# Patient Record
Sex: MALE | Race: WHITE | NOT HISPANIC OR LATINO | URBAN - METROPOLITAN AREA
[De-identification: names, ages, dates, MRNs, and addresses within clinical notes are randomized per-mention and may not be internally consistent; named-entity substitution may affect disease eponyms.]

---

## 2017-02-17 ENCOUNTER — EMERGENCY (EMERGENCY)
Facility: HOSPITAL | Age: 37
LOS: 1 days | Discharge: HOME | End: 2017-02-17
Attending: EMERGENCY MEDICINE | Admitting: EMERGENCY MEDICINE
Payer: MEDICAID

## 2017-02-17 VITALS
SYSTOLIC BLOOD PRESSURE: 128 MMHG | HEART RATE: 81 BPM | DIASTOLIC BLOOD PRESSURE: 72 MMHG | RESPIRATION RATE: 16 BRPM | OXYGEN SATURATION: 100 %

## 2017-02-17 VITALS
OXYGEN SATURATION: 100 % | HEART RATE: 65 BPM | DIASTOLIC BLOOD PRESSURE: 71 MMHG | SYSTOLIC BLOOD PRESSURE: 110 MMHG | TEMPERATURE: 97 F | RESPIRATION RATE: 16 BRPM

## 2017-02-17 LAB
ALBUMIN SERPL ELPH-MCNC: 4.3 G/DL — SIGNIFICANT CHANGE UP (ref 3.3–5)
ALP SERPL-CCNC: 65 U/L — SIGNIFICANT CHANGE UP (ref 40–120)
ALT FLD-CCNC: 13 U/L — SIGNIFICANT CHANGE UP (ref 4–41)
AST SERPL-CCNC: 20 U/L — SIGNIFICANT CHANGE UP (ref 4–40)
BASOPHILS # BLD AUTO: 0.01 K/UL — SIGNIFICANT CHANGE UP (ref 0–0.2)
BASOPHILS NFR BLD AUTO: 0.1 % — SIGNIFICANT CHANGE UP (ref 0–2)
BILIRUB SERPL-MCNC: 0.2 MG/DL — SIGNIFICANT CHANGE UP (ref 0.2–1.2)
BUN SERPL-MCNC: 4 MG/DL — LOW (ref 7–23)
CALCIUM SERPL-MCNC: 9.4 MG/DL — SIGNIFICANT CHANGE UP (ref 8.4–10.5)
CHLORIDE SERPL-SCNC: 88 MMOL/L — LOW (ref 98–107)
CO2 SERPL-SCNC: 27 MMOL/L — SIGNIFICANT CHANGE UP (ref 22–31)
CREAT SERPL-MCNC: 0.59 MG/DL — SIGNIFICANT CHANGE UP (ref 0.5–1.3)
EOSINOPHIL # BLD AUTO: 0.2 K/UL — SIGNIFICANT CHANGE UP (ref 0–0.5)
EOSINOPHIL NFR BLD AUTO: 2.9 % — SIGNIFICANT CHANGE UP (ref 0–6)
GLUCOSE SERPL-MCNC: 83 MG/DL — SIGNIFICANT CHANGE UP (ref 70–99)
HCT VFR BLD CALC: 40 % — SIGNIFICANT CHANGE UP (ref 39–50)
HGB BLD-MCNC: 13.7 G/DL — SIGNIFICANT CHANGE UP (ref 13–17)
IMM GRANULOCYTES NFR BLD AUTO: 0.3 % — SIGNIFICANT CHANGE UP (ref 0–1.5)
LYMPHOCYTES # BLD AUTO: 1.72 K/UL — SIGNIFICANT CHANGE UP (ref 1–3.3)
LYMPHOCYTES # BLD AUTO: 24.8 % — SIGNIFICANT CHANGE UP (ref 13–44)
MCHC RBC-ENTMCNC: 32.7 PG — SIGNIFICANT CHANGE UP (ref 27–34)
MCHC RBC-ENTMCNC: 34.3 % — SIGNIFICANT CHANGE UP (ref 32–36)
MCV RBC AUTO: 95.5 FL — SIGNIFICANT CHANGE UP (ref 80–100)
MONOCYTES # BLD AUTO: 0.92 K/UL — HIGH (ref 0–0.9)
MONOCYTES NFR BLD AUTO: 13.3 % — SIGNIFICANT CHANGE UP (ref 2–14)
NEUTROPHILS # BLD AUTO: 4.06 K/UL — SIGNIFICANT CHANGE UP (ref 1.8–7.4)
NEUTROPHILS NFR BLD AUTO: 58.6 % — SIGNIFICANT CHANGE UP (ref 43–77)
PLATELET # BLD AUTO: 201 K/UL — SIGNIFICANT CHANGE UP (ref 150–400)
PMV BLD: 10.8 FL — SIGNIFICANT CHANGE UP (ref 7–13)
POTASSIUM SERPL-MCNC: 3.8 MMOL/L — SIGNIFICANT CHANGE UP (ref 3.5–5.3)
POTASSIUM SERPL-SCNC: 3.8 MMOL/L — SIGNIFICANT CHANGE UP (ref 3.5–5.3)
PROT SERPL-MCNC: 7.2 G/DL — SIGNIFICANT CHANGE UP (ref 6–8.3)
RBC # BLD: 4.19 M/UL — LOW (ref 4.2–5.8)
RBC # FLD: 12.5 % — SIGNIFICANT CHANGE UP (ref 10.3–14.5)
SODIUM SERPL-SCNC: 131 MMOL/L — LOW (ref 135–145)
WBC # BLD: 6.93 K/UL — SIGNIFICANT CHANGE UP (ref 3.8–10.5)
WBC # FLD AUTO: 6.93 K/UL — SIGNIFICANT CHANGE UP (ref 3.8–10.5)

## 2017-02-17 PROCEDURE — 70450 CT HEAD/BRAIN W/O DYE: CPT | Mod: 26

## 2017-02-17 PROCEDURE — 99284 EMERGENCY DEPT VISIT MOD MDM: CPT

## 2017-02-17 RX ORDER — TETANUS TOXOID, REDUCED DIPHTHERIA TOXOID AND ACELLULAR PERTUSSIS VACCINE, ADSORBED 5; 2.5; 8; 8; 2.5 [IU]/.5ML; [IU]/.5ML; UG/.5ML; UG/.5ML; UG/.5ML
0.5 SUSPENSION INTRAMUSCULAR ONCE
Qty: 0 | Refills: 0 | Status: COMPLETED | OUTPATIENT
Start: 2017-02-17 | End: 2017-02-17

## 2017-02-17 RX ORDER — MIDAZOLAM HYDROCHLORIDE 1 MG/ML
2 INJECTION, SOLUTION INTRAMUSCULAR; INTRAVENOUS ONCE
Qty: 0 | Refills: 0 | Status: DISCONTINUED | OUTPATIENT
Start: 2017-02-17 | End: 2017-02-17

## 2017-02-17 RX ORDER — HALOPERIDOL DECANOATE 100 MG/ML
5 INJECTION INTRAMUSCULAR ONCE
Qty: 0 | Refills: 0 | Status: COMPLETED | OUTPATIENT
Start: 2017-02-17 | End: 2017-02-17

## 2017-02-17 RX ADMIN — Medication 2 MILLIGRAM(S): at 13:40

## 2017-02-17 RX ADMIN — Medication 2 MILLIGRAM(S): at 12:23

## 2017-02-17 RX ADMIN — MIDAZOLAM HYDROCHLORIDE 2 MILLIGRAM(S): 1 INJECTION, SOLUTION INTRAMUSCULAR; INTRAVENOUS at 14:31

## 2017-02-17 RX ADMIN — TETANUS TOXOID, REDUCED DIPHTHERIA TOXOID AND ACELLULAR PERTUSSIS VACCINE, ADSORBED 0.5 MILLILITER(S): 5; 2.5; 8; 8; 2.5 SUSPENSION INTRAMUSCULAR at 16:01

## 2017-02-17 NOTE — ED PROVIDER NOTE - CARE PLAN
Principal Discharge DX:	Contusion  Instructions for follow-up, activity and diet:	Rest, drink plenty of fluids.  Advance activity as tolerated.  Continue all previously prescribed medications as directed.  Take Tylenol 650mg (Two 325 mg pills) every 4-6 hours as needed for pain. Keep abrasion clean and dry.  Apply bacitracin daily and cover.  Follow up with your primary care physician in 48-72 hours- bring copies of your results.  Return to the ER for worsening or persistent symptoms, including weakness, vomiting, visual/hearing changes, worsening/persistent pain and/or ANY NEW OR CONCERNING SYMPTOMS. If you have issues obtaining follow up, please call: [phone] to obtain a doctor or specialist who takes your insurance in your area.  Secondary Diagnosis:	Abrasion Principal Discharge DX:	Contusion  Instructions for follow-up, activity and diet:	Rest, drink plenty of fluids.  Advance activity as tolerated.  Continue all previously prescribed medications as directed.  Take Tylenol 650mg (Two 325 mg pills) every 4-6 hours as needed for pain. Keep abrasion clean and dry.  Apply bacitracin daily and cover.  Follow up with your primary care physician in 48-72 hours- bring copies of your results.  Return to the ER for worsening or persistent symptoms, including weakness, vomiting, visual/hearing changes, worsening/persistent pain and/or ANY NEW OR CONCERNING SYMPTOMS. If you have issues obtaining follow up, please call: [phone] to obtain a doctor or specialist who takes your insurance in your area.  Secondary Diagnosis:	Abrasion Principal Discharge DX:	Contusion  Instructions for follow-up, activity and diet:	Rest, drink plenty of fluids.  Advance activity as tolerated.  Continue all previously prescribed medications as directed.  Take Tylenol 650mg (Two 325 mg pills) every 4-6 hours as needed for pain. Keep abrasion clean and dry.  Apply bacitracin daily and cover.  Follow up with your primary care physician in 48-72 hours- bring copies of your results.  Return to the ER for worsening or persistent symptoms, including weakness, vomiting, visual/hearing changes, worsening/persistent pain and/or ANY NEW OR CONCERNING SYMPTOMS. If you have issues obtaining follow up, please call: [phone] to obtain a doctor or specialist who takes your insurance in your area.  Secondary Diagnosis:	Abrasion Principal Discharge DX:	Contusion  Instructions for follow-up, activity and diet:	Rest, drink plenty of fluids.  Advance activity as tolerated.  Continue all previously prescribed medications as directed.  Take Tylenol 650mg (Two 325 mg pills) every 4-6 hours as needed for pain. Keep abrasion clean and dry.  Apply bacitracin daily and cover.  Follow up with your primary care physician in 48-72 hours- bring copies of your results.  Return to the ER for worsening or persistent symptoms, including weakness, vomiting, visual/hearing changes, worsening/persistent pain and/or ANY NEW OR CONCERNING SYMPTOMS. If you have issues obtaining follow up, please call: [phone] to obtain a doctor or specialist who takes your insurance in your area.  Secondary Diagnosis:	Abrasion Principal Discharge DX:	Contusion  Instructions for follow-up, activity and diet:	Rest, drink plenty of fluids.  Advance activity as tolerated.  Continue all previously prescribed medications as directed.  Take Tylenol 650mg (Two 325 mg pills) every 4-6 hours as needed for pain. Keep abrasion clean and dry.  Apply bacitracin daily and cover.  Follow up with your primary care physician in 48-72 hours- bring copies of your results.  Return to the ER for worsening or persistent symptoms, including weakness, vomiting, visual/hearing changes, worsening/persistent pain and/or ANY NEW OR CONCERNING SYMPTOMS. If you have issues obtaining follow up, please call: [phone] to obtain a doctor or specialist who takes your insurance in your area.  Secondary Diagnosis:	Abrasion Principal Discharge DX:	Contusion  Instructions for follow-up, activity and diet:	Rest, drink plenty of fluids.  Advance activity as tolerated.  Continue all previously prescribed medications as directed.  Take Tylenol 650mg (Two 325 mg pills) every 4-6 hours as needed for pain. Keep abrasion clean and dry.  Apply bacitracin daily and cover.  Follow up with your primary care physician in 48-72 hours- bring copies of your results.  Return to the ER for worsening or persistent symptoms, including weakness, vomiting, visual/hearing changes, worsening/persistent pain and/or ANY NEW OR CONCERNING SYMPTOMS. If you have issues obtaining follow up, please call: [phone] to obtain a doctor or specialist who takes your insurance in your area.  Secondary Diagnosis:	Abrasion

## 2017-02-17 NOTE — ED PROVIDER NOTE - ATTENDING CONTRIBUTION TO CARE
36 y/o M w/ PMHx of Autism, Impulse Control Disorder, Pervasive developmental disorder, Leukopenia, thrombocytopenia, bib aides for left sup eyebrow contusion  Pt unable to provide  hx   staff states pt arrived at day program from residence with contusion/swelling.  they state they also spoke w residence, no known trauma.   pt w normal behaviour for him.  eating this am.  Pt is at baseline as per staff. Last Tetanus unknown.   PE vss, intermittently yelling, striking own head w fists, left upper eyelid/eyebrow small swelling w minimal abrasion, ecchymoses, eomi perrla,no crepitus, heent o/w nml, neck supple.  no midline no bony tender.  ext from strength5/5.  imp/plan- head injury, behaving appropriately per staff, w hx of thrombocytopenia, unknown mechanism , will ct head, reeval   I have discussed patient's plan of care and disposition with PA. 36 y/o M w/ PMHx of Autism, Impulse Control Disorder, Pervasive developmental disorder, Leukopenia, thrombocytopenia, bib aides for left sup eyebrow contusion  Pt unable to provide  hx   staff states pt arrived at day program from residence with contusion/swelling.  they state they also spoke w residence, no known trauma.   pt w normal behaviour for him.  eating this am.  Pt is at baseline as per staff. Last Tetanus unknown.   PE vss, intermittently yelling, striking own head w fists, left upper eyelid/eyebrow small swelling w minimal abrasion, ecchymoses, eomi perrla,no crepitus, heent o/w nml, neck supple.  no midline no bony tender.  ext from strength5/5.  imp/plan- head injury, behaving appropriately per staff, w hx of thrombocytopenia, unknown mechanism , will ct head, reeval   I have discussed patient's plan of care and disposition with PA.  pt able to have ct approx 245pm sp 2mg versed, labs reviewed, platelet 201  continue obs 38 y/o M w/ PMHx of Autism, Impulse Control Disorder, Pervasive developmental disorder, Leukopenia, thrombocytopenia, bib aides for left sup eyebrow contusion  Pt unable to provide  hx   staff states pt arrived at day program from residence with contusion/swelling.  they state they also spoke w residence, no known trauma.   pt w normal behaviour for him.  eating this am.  Pt is at baseline as per staff. Last Tetanus unknown.   PE vss, intermittently yelling, striking own head w fists, left upper eyelid/eyebrow small swelling w minimal abrasion, ecchymoses, eomi perrla,no crepitus, heent o/w nml, neck supple.  no midline no bony tender.  ext from strength5/5.  imp/plan- head injury, behaving appropriately per staff, w hx of thrombocytopenia, unknown mechanism , will ct head, reeval   I have discussed patient's plan of care and disposition with PA.  pt able to have ct approx 245pm sp 2mg versed, labs reviewed, platelet 201  continue obs  6pm pt improving, s/o dr torres dispo accordingly

## 2017-02-17 NOTE — ED PROVIDER NOTE - PLAN OF CARE
Rest, drink plenty of fluids.  Advance activity as tolerated.  Continue all previously prescribed medications as directed.  Take Tylenol 650mg (Two 325 mg pills) every 4-6 hours as needed for pain. Keep abrasion clean and dry.  Apply bacitracin daily and cover.  Follow up with your primary care physician in 48-72 hours- bring copies of your results.  Return to the ER for worsening or persistent symptoms, including weakness, vomiting, visual/hearing changes, worsening/persistent pain and/or ANY NEW OR CONCERNING SYMPTOMS. If you have issues obtaining follow up, please call: [phone] to obtain a doctor or specialist who takes your insurance in your area.

## 2017-02-17 NOTE — ED ADULT NURSE REASSESSMENT NOTE - NS ED NURSE REASSESS COMMENT FT1
Pt. agitated with autism and impulse control disorder received in rm 9. Ativan 2mg and Haldol 5mg IM ordered by MD. Both medications drawn up in separate syringes before entering the room due to agitation and safety precautions. First medication was delivered, second medication was discarded on accident in sharps container. Unable to identify which medication was given first. ZENIA Leslie informed right away; no harm done to patient. Awaiting response to medication given for CT. Incident rpt written up and manager made aware of situation. Will continue to monitor.

## 2017-02-17 NOTE — ED PROVIDER NOTE - EYE, LEFT
pupils equal, round, and reactive to light/extra occular movements intact, perrla, mild swelling to L eyebrow ridge w/ subcentimeter abrasion, nontender, no step offs, no ecchymosis pupils equal, round, and reactive to light/extra occular movements intact, perrla, mild swelling to L eyebrow ridge w/ subcentimeter abrasion, nontender, no step offs, no ecchymosis, no redness, no wamrth

## 2017-02-17 NOTE — ED ADULT TRIAGE NOTE - CHIEF COMPLAINT QUOTE
Pt brought in by 2 staff members from day program. Staff noticed hematoma to left outer eye brow area. No active bleeding noted, but large raised bump noted to area. Hx of Autism, nonverbal.

## 2017-02-17 NOTE — ED PROVIDER NOTE - NS ED MD SCRIBE ATTENDING SCRIBE SECTIONS
HIV/HISTORY OF PRESENT ILLNESS/REVIEW OF SYSTEMS/PHYSICAL EXAM/PAST MEDICAL/SURGICAL/SOCIAL HISTORY/VITAL SIGNS( Pullset)/DISPOSITION

## 2017-02-17 NOTE — ED PROVIDER NOTE - PROGRESS NOTE DETAILS
rn spoke w ZENIA foy, was to administer 5mg haldol/2mg ativan, rn gave one of injections,not sure which one, and then threw out other syringe w/o giving to pt.    will continue to observe to see if pt needs additional sedation for CT pt brought to CT x 2, not allowing ct to be done, given additional round of ativan.  placed IV, will try versed 2mg, try again ZENIA Riosos:  CT shows no acute pathology.  As per accompanying staff, pt at baseline.  Pt ambulating without any difficulty.  Vital signs stable.  Pt to follow up with PMD. ZENIA Leslie:  DANIELLAS. As per accompanying staff, pt mildly staggering while walking.  Will continue to observe. ZENIA Leslie:  As per accompanying staff, pt ambulation at baseline.  Pt walking around unit without difficulty.  Vital signs stable.  Pt medically stable for discharge.  Pt to follow up with PMD. ZENIA Leslie:  As per accompanying staff, pt ambulation at baseline.  Pt walking around unit without difficulty.  Vital signs stable.  Pt medically stable for discharge.  As per accompanying staff member, pt can will be monitored and observed 24/7 at his residence.  Pt to follow up with PMD. The scribe's documentation has been prepared under my direction and personally reviewed by me in its entirety. I confirm that the note above accurately reflects all work, treatment, procedures, and medical decision making performed by me, Saurabh Leslie PA-C.

## 2017-02-17 NOTE — ED ADULT NURSE REASSESSMENT NOTE - ANCILLARY STATUS
lab results pending/awaiting CT scan, pt unable to lay still in scanner returned to intake 9/awaiting radiology

## 2017-02-17 NOTE — ED ADULT NURSE REASSESSMENT NOTE - GENERAL PATIENT STATE
no change observed/pt continues to try to get up and walk in room, 2 health aides from facility with patient/comfortable appearance

## 2017-02-17 NOTE — ED ADULT NURSE REASSESSMENT NOTE - NS ED NURSE REASSESS COMMENT FT1
Pt lying in bed slightly drowsy but easily arousable; aide from facility at bedside. VS done as charted; breathing well on RA. Will continue to monitor.

## 2017-02-17 NOTE — ED PROVIDER NOTE - OBJECTIVE STATEMENT
38 y/o M w/ PMHx of, as derived from accompanying documentation, Autism, Impulse Control Disorder, Pervasive developmental disorder, Leukopenia, thrombocytopenia, p/w contusion to L eyelid/eyebrow. Pt unable to provide own hx secondary to sever autism. As per accompanying staff, pt arrived to day program and found to have abrasion and mild swelling to L eyebrow. Pt w/o any witnessed fall, or trauma. No known h/o fever, chills. As per staff, pt w/o any vomiting, gait disturbance, noticeable weakness, fecal/urinary incontinence, or pain. Pt is at baseline as per staff. Last Tetanus unknown.

## 2017-02-17 NOTE — ED PROVIDER NOTE - PMH
Autism    Impulse control disorder    Leukopenia    Pervasive developmental disorder    Thrombocytopenia

## 2017-02-17 NOTE — ED PROVIDER NOTE - MEDICAL DECISION MAKING DETAILS
38 y/o M, w/ PMHx as derived from accompanying documentation Autism, Impulse Control Disorder, Pervasive developmental disorder, Leukopenia, thrombocytopenia, p/w contusion to L eyelid/eyebrow -- likely contusion, no gross evidence of neuro deficits, given unknown mechanism and documented h/o thrombocytopenia will do CT head, Adacel. 38 y/o M, w/ PMHx as derived from accompanying documentation Autism, Impulse Control Disorder, Pervasive developmental disorder, Leukopenia, thrombocytopenia, p/w contusion to L eyelid/eyebrow -- likely contusion, no e/o infection, no gross evidence of neuro deficits, given unknown mechanism and documented h/o thrombocytopenia will do CT head, Adacel.

## 2017-05-02 ENCOUNTER — OUTPATIENT (OUTPATIENT)
Dept: OUTPATIENT SERVICES | Facility: HOSPITAL | Age: 37
LOS: 1 days | End: 2017-05-02

## 2017-05-04 DIAGNOSIS — Z01.20 ENCOUNTER FOR DENTAL EXAMINATION AND CLEANING WITHOUT ABNORMAL FINDINGS: ICD-10-CM

## 2018-11-01 ENCOUNTER — OUTPATIENT (OUTPATIENT)
Dept: OUTPATIENT SERVICES | Facility: HOSPITAL | Age: 38
LOS: 1 days | End: 2018-11-01
Payer: MEDICAID

## 2018-11-01 VITALS
OXYGEN SATURATION: 98 % | TEMPERATURE: 97 F | WEIGHT: 175.93 LBS | DIASTOLIC BLOOD PRESSURE: 65 MMHG | RESPIRATION RATE: 18 BRPM | SYSTOLIC BLOOD PRESSURE: 99 MMHG | HEART RATE: 62 BPM | HEIGHT: 69 IN

## 2018-11-01 DIAGNOSIS — Z92.89 PERSONAL HISTORY OF OTHER MEDICAL TREATMENT: Chronic | ICD-10-CM

## 2018-11-01 DIAGNOSIS — Z01.818 ENCOUNTER FOR OTHER PREPROCEDURAL EXAMINATION: ICD-10-CM

## 2018-11-01 DIAGNOSIS — K02.9 DENTAL CARIES, UNSPECIFIED: ICD-10-CM

## 2018-11-01 DIAGNOSIS — F84.0 AUTISTIC DISORDER: ICD-10-CM

## 2018-11-01 LAB
ANION GAP SERPL CALC-SCNC: 10 MMOL/L — SIGNIFICANT CHANGE UP (ref 5–17)
BUN SERPL-MCNC: 5 MG/DL — LOW (ref 7–23)
CALCIUM SERPL-MCNC: 9.8 MG/DL — SIGNIFICANT CHANGE UP (ref 8.4–10.5)
CHLORIDE SERPL-SCNC: 94 MMOL/L — LOW (ref 96–108)
CO2 SERPL-SCNC: 26 MMOL/L — SIGNIFICANT CHANGE UP (ref 22–31)
CREAT SERPL-MCNC: 0.63 MG/DL — SIGNIFICANT CHANGE UP (ref 0.5–1.3)
GLUCOSE SERPL-MCNC: 96 MG/DL — SIGNIFICANT CHANGE UP (ref 70–99)
HCT VFR BLD CALC: 42.1 % — SIGNIFICANT CHANGE UP (ref 39–50)
HGB BLD-MCNC: 14.3 G/DL — SIGNIFICANT CHANGE UP (ref 13–17)
MCHC RBC-ENTMCNC: 31.8 PG — SIGNIFICANT CHANGE UP (ref 27–34)
MCHC RBC-ENTMCNC: 34 GM/DL — SIGNIFICANT CHANGE UP (ref 32–36)
MCV RBC AUTO: 93.6 FL — SIGNIFICANT CHANGE UP (ref 80–100)
PLATELET # BLD AUTO: 191 K/UL — SIGNIFICANT CHANGE UP (ref 150–400)
POTASSIUM SERPL-MCNC: 3.9 MMOL/L — SIGNIFICANT CHANGE UP (ref 3.5–5.3)
POTASSIUM SERPL-SCNC: 3.9 MMOL/L — SIGNIFICANT CHANGE UP (ref 3.5–5.3)
RBC # BLD: 4.5 M/UL — SIGNIFICANT CHANGE UP (ref 4.2–5.8)
RBC # FLD: 12.7 % — SIGNIFICANT CHANGE UP (ref 10.3–14.5)
SODIUM SERPL-SCNC: 130 MMOL/L — LOW (ref 135–145)
WBC # BLD: 5.24 K/UL — SIGNIFICANT CHANGE UP (ref 3.8–10.5)
WBC # FLD AUTO: 5.24 K/UL — SIGNIFICANT CHANGE UP (ref 3.8–10.5)

## 2018-11-01 PROCEDURE — 85027 COMPLETE CBC AUTOMATED: CPT

## 2018-11-01 PROCEDURE — G0463: CPT

## 2018-11-01 PROCEDURE — 80048 BASIC METABOLIC PNL TOTAL CA: CPT

## 2018-11-01 NOTE — H&P PST ADULT - PROBLEM SELECTOR PLAN 2
Pt non-verbal. Lives in [facility]: [facility] [phone]. Gerard & Justin are [facility] Mgrs. Father will be at bedside day of sx for consent.

## 2018-11-01 NOTE — H&P PST ADULT - HISTORY OF PRESENT ILLNESS
38yr old male presents to PST for Multiple Extractions & Restorations on 11/8/2018. Hx of Autism, Impulsive Control Disorder and Pervasive Developmental Disorder. Pt unable to provide own hx secondary to severe autism. Pt accompanied by father Bridger Mikael. As per father, [facility] Pt will be accompanied by father day orf sx and will remain with him overnight. Medical evaluation to be completed. As per father no active health issues, pt eats well, denies any fevers or colds and is doing well otherwise.

## 2018-11-01 NOTE — H&P PST ADULT - NSANTHOSAYNRD_GEN_A_CORE
No. MARY ALICE screening performed.  STOP BANG Legend: 0-2 = LOW Risk; 3-4 = INTERMEDIATE Risk; 5-8 = HIGH Risk

## 2018-11-01 NOTE — H&P PST ADULT - PMH
Autism    Dental caries, unspecified    Impulse control disorder    Leukopenia    Pervasive developmental disorder    Thrombocytopenia

## 2019-03-11 PROBLEM — F63.9 IMPULSE DISORDER, UNSPECIFIED: Chronic | Status: ACTIVE | Noted: 2017-02-17

## 2019-03-11 PROBLEM — D72.819 DECREASED WHITE BLOOD CELL COUNT, UNSPECIFIED: Chronic | Status: ACTIVE | Noted: 2017-02-17

## 2019-03-11 PROBLEM — F84.9 PERVASIVE DEVELOPMENTAL DISORDER, UNSPECIFIED: Chronic | Status: ACTIVE | Noted: 2017-02-17

## 2019-03-11 PROBLEM — D69.6 THROMBOCYTOPENIA, UNSPECIFIED: Chronic | Status: ACTIVE | Noted: 2017-02-17

## 2019-03-11 PROBLEM — K02.9 DENTAL CARIES, UNSPECIFIED: Chronic | Status: ACTIVE | Noted: 2018-11-01

## 2019-03-11 PROBLEM — F84.0 AUTISTIC DISORDER: Chronic | Status: ACTIVE | Noted: 2017-02-17

## 2019-03-11 PROBLEM — Z00.00 ENCOUNTER FOR PREVENTIVE HEALTH EXAMINATION: Status: ACTIVE | Noted: 2019-03-11

## 2019-04-01 ENCOUNTER — APPOINTMENT (OUTPATIENT)
Dept: OPHTHALMOLOGY | Facility: CLINIC | Age: 39
End: 2019-04-01
Payer: MEDICAID

## 2019-04-01 PROCEDURE — 92002 INTRM OPH EXAM NEW PATIENT: CPT

## 2019-11-13 ENCOUNTER — EMERGENCY (EMERGENCY)
Facility: HOSPITAL | Age: 39
LOS: 1 days | Discharge: HOME | End: 2019-11-13
Attending: EMERGENCY MEDICINE | Admitting: EMERGENCY MEDICINE
Payer: MEDICAID

## 2019-11-13 VITALS — HEART RATE: 92 BPM | TEMPERATURE: 98 F | SYSTOLIC BLOOD PRESSURE: 124 MMHG | DIASTOLIC BLOOD PRESSURE: 78 MMHG

## 2019-11-13 DIAGNOSIS — Z92.89 PERSONAL HISTORY OF OTHER MEDICAL TREATMENT: Chronic | ICD-10-CM

## 2019-11-13 PROCEDURE — 99283 EMERGENCY DEPT VISIT LOW MDM: CPT
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
## 2019-11-13 NOTE — ED ADULT NURSE NOTE - DISCHARGE DATE/TIME
13-Nov-2019 13:04 Partial Purse String (Simple) Text: Given the location of the defect and the characteristics of the surrounding skin a simple purse string closure was deemed most appropriate.  Undermining was performed circumfirentially around the surgical defect.  A purse string suture was then placed and tightened. Wound tension only allowed a partial closure of the circular defect.
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
## 2019-11-13 NOTE — ED PROVIDER NOTE - PATIENT PORTAL LINK FT
You can access the FollowMyHealth Patient Portal offered by [facility] by registering at the following website: [email or web address]. By joining [facility]’s FollowMyHealth portal, you will also be able to view your health information using other applications (apps) compatible with our system.

## 2019-11-13 NOTE — ED PROVIDER NOTE - CLINICAL SUMMARY MEDICAL DECISION MAKING FREE TEXT BOX
39M h/o autism, pervasive developmental disorder, nonverbal, impulse control disorder presents with agitation. No recent complaints of illness or injury. No evidence of head trauma from head banging behavior. Given IM haldol and ativan for agitation. Will speak w group home regarding recent behavior or changes.

## 2019-11-13 NOTE — ED PROVIDER NOTE - NSFOLLOWUPINSTRUCTIONS_ED_ALL_ED_FT
Take all medications as directed.  Follow up with your primary physician in 3-4 days.  Return to the ER for worsening symptoms or any other concerns.

## 2019-11-13 NOTE — ED ADULT TRIAGE NOTE - CHIEF COMPLAINT QUOTE
pt arrives agitated and aggressive with [employer] in cuffs has hx autism/MR unable for B/P pt is agitated and sitting up aggressive
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
## 2019-11-13 NOTE — ED ADULT NURSE NOTE - OBJECTIVE STATEMENT
pt sent by group home for agitation, combative, MR, with facility staff, pt not cooperative, unable to be control by staff.  a small pt arrive with EMS Dr. Parkinson examined pt medicated, with good result, pt was able to rest, cooperative at times, V/S taking stable, afebrile, pt was able to be D/C SW and collateral was done, pt d/c facility staff with transport  pt.     all belonging given.
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
## 2019-11-13 NOTE — ED PROVIDER NOTE - PROGRESS NOTE DETAILS
Calm and cooperative after medication. SW spoke w group home, no change in behavior, compliant w meds. No concerns. VSS. No signs of infection. Able to tolerate PO and ambulate independently. Will cancel labs given no acute medical concerns and discharge w staff from group home. CELESTE Parkinson MD Calm and cooperative after medication (5mg haldol, 2mg ativan given IM). SW spoke w group home, no change in behavior, compliant w meds. No concerns. VSS. No signs of infection. Able to tolerate PO and ambulate independently. Will cancel labs given no acute medical concerns and discharge w staff from group home. Patient is medically clear for discharge home to resume normal activities. CELESTE Parkinson MD

## 2020-02-10 ENCOUNTER — OUTPATIENT (OUTPATIENT)
Dept: OUTPATIENT SERVICES | Facility: HOSPITAL | Age: 40
LOS: 1 days | End: 2020-02-10

## 2020-02-10 VITALS
TEMPERATURE: 97 F | HEART RATE: 95 BPM | OXYGEN SATURATION: 97 % | SYSTOLIC BLOOD PRESSURE: 120 MMHG | RESPIRATION RATE: 16 BRPM | HEIGHT: 69 IN | DIASTOLIC BLOOD PRESSURE: 75 MMHG | WEIGHT: 145.95 LBS

## 2020-02-10 DIAGNOSIS — Z92.89 PERSONAL HISTORY OF OTHER MEDICAL TREATMENT: Chronic | ICD-10-CM

## 2020-02-10 DIAGNOSIS — F91.9 CONDUCT DISORDER, UNSPECIFIED: ICD-10-CM

## 2020-02-10 DIAGNOSIS — F81.9 DEVELOPMENTAL DISORDER OF SCHOLASTIC SKILLS, UNSPECIFIED: ICD-10-CM

## 2020-02-10 LAB
ANION GAP SERPL CALC-SCNC: 15 MMO/L — HIGH (ref 7–14)
BUN SERPL-MCNC: 6 MG/DL — LOW (ref 7–23)
CALCIUM SERPL-MCNC: 9.6 MG/DL — SIGNIFICANT CHANGE UP (ref 8.4–10.5)
CHLORIDE SERPL-SCNC: 88 MMOL/L — LOW (ref 98–107)
CO2 SERPL-SCNC: 24 MMOL/L — SIGNIFICANT CHANGE UP (ref 22–31)
CREAT SERPL-MCNC: 0.58 MG/DL — SIGNIFICANT CHANGE UP (ref 0.5–1.3)
GLUCOSE SERPL-MCNC: 70 MG/DL — SIGNIFICANT CHANGE UP (ref 70–99)
HCT VFR BLD CALC: 42 % — SIGNIFICANT CHANGE UP (ref 39–50)
HGB BLD-MCNC: 13.6 G/DL — SIGNIFICANT CHANGE UP (ref 13–17)
MCHC RBC-ENTMCNC: 31.9 PG — SIGNIFICANT CHANGE UP (ref 27–34)
MCHC RBC-ENTMCNC: 32.4 % — SIGNIFICANT CHANGE UP (ref 32–36)
MCV RBC AUTO: 98.6 FL — SIGNIFICANT CHANGE UP (ref 80–100)
NRBC # FLD: 0 K/UL — SIGNIFICANT CHANGE UP (ref 0–0)
PLATELET # BLD AUTO: 172 K/UL — SIGNIFICANT CHANGE UP (ref 150–400)
PMV BLD: 11.4 FL — SIGNIFICANT CHANGE UP (ref 7–13)
POTASSIUM SERPL-MCNC: 3.9 MMOL/L — SIGNIFICANT CHANGE UP (ref 3.5–5.3)
POTASSIUM SERPL-SCNC: 3.9 MMOL/L — SIGNIFICANT CHANGE UP (ref 3.5–5.3)
RBC # BLD: 4.26 M/UL — SIGNIFICANT CHANGE UP (ref 4.2–5.8)
RBC # FLD: 12.9 % — SIGNIFICANT CHANGE UP (ref 10.3–14.5)
SODIUM SERPL-SCNC: 127 MMOL/L — LOW (ref 135–145)
WBC # BLD: 6.21 K/UL — SIGNIFICANT CHANGE UP (ref 3.8–10.5)
WBC # FLD AUTO: 6.21 K/UL — SIGNIFICANT CHANGE UP (ref 3.8–10.5)

## 2020-02-10 RX ORDER — SODIUM FLUORIDE 1.1 G/100G
1 GEL ORAL
Qty: 0 | Refills: 0 | DISCHARGE

## 2020-02-10 RX ORDER — POLYETHYLENE GLYCOL 3350 17 G/17G
1700 POWDER, FOR SOLUTION ORAL
Qty: 0 | Refills: 0 | DISCHARGE

## 2020-02-10 NOTE — H&P PST ADULT - NSICDXPROBLEM_GEN_ALL_CORE_FT
PROBLEM DIAGNOSES  Problem: Developmental disorder of scholastic skills  Assessment and Plan: Pt scheduled for surgery on 2/14/2020.   Preop instructions provided to Gianna - written instructions for residence.   Pt's parents are gaurdians and will sign consent for procedure.  Pt is obtaining medical clearance from Dr. Cardenas - copy requested.

## 2020-02-10 NOTE — H&P PST ADULT - HISTORY OF PRESENT ILLNESS
40 year old patient presents today for presurgical evaluation for ...  Pt with hx of autism, nonverbal - from group home facility  accompanied by Gianna - . 40 year old patient presents today for presurgical evaluation for Restorations, Extractions, Examination Under Anesthesia scheduled on 2/14/2020.   Pt with hx of autism, nonverbal - from residential facility  accompanied by Gianna - .   Pt unable to answer questions - limited history obtained from charts and information by Gianna.

## 2020-02-10 NOTE — H&P PST ADULT - MUSCULOSKELETAL
details… detailed exam no joint erythema/no joint swelling/no joint warmth/no calf tenderness/ROM intact/normal strength

## 2020-02-10 NOTE — H&P PST ADULT - NSICDXPASTMEDICALHX_GEN_ALL_CORE_FT
PAST MEDICAL HISTORY:  Autism     Bipolar disorder     Dental caries, unspecified     History of pica     Impulse control disorder     Leukopenia     Lyme disease 2016    Pervasive developmental disorder     Thrombocytopenia

## 2020-02-13 ENCOUNTER — TRANSCRIPTION ENCOUNTER (OUTPATIENT)
Age: 40
End: 2020-02-13

## 2020-02-13 NOTE — ASU PATIENT PROFILE, ADULT - PMH
Autism    Bipolar disorder    Dental caries, unspecified    History of pica    Impulse control disorder    Leukopenia    Lyme disease  2016  Pervasive developmental disorder    Thrombocytopenia

## 2020-02-14 ENCOUNTER — OUTPATIENT (OUTPATIENT)
Dept: OUTPATIENT SERVICES | Facility: HOSPITAL | Age: 40
LOS: 1 days | Discharge: HOME | End: 2020-02-14

## 2020-02-14 VITALS
OXYGEN SATURATION: 100 % | TEMPERATURE: 96 F | SYSTOLIC BLOOD PRESSURE: 98 MMHG | RESPIRATION RATE: 16 BRPM | HEIGHT: 70 IN | DIASTOLIC BLOOD PRESSURE: 70 MMHG | HEART RATE: 81 BPM | WEIGHT: 167.99 LBS

## 2020-02-14 VITALS
RESPIRATION RATE: 18 BRPM | HEART RATE: 86 BPM | OXYGEN SATURATION: 99 % | DIASTOLIC BLOOD PRESSURE: 71 MMHG | SYSTOLIC BLOOD PRESSURE: 102 MMHG

## 2020-02-14 DIAGNOSIS — F91.9 CONDUCT DISORDER, UNSPECIFIED: ICD-10-CM

## 2020-02-14 DIAGNOSIS — Z92.89 PERSONAL HISTORY OF OTHER MEDICAL TREATMENT: Chronic | ICD-10-CM

## 2020-02-14 RX ORDER — SODIUM CHLORIDE 9 MG/ML
1000 INJECTION, SOLUTION INTRAVENOUS
Refills: 0 | Status: DISCONTINUED | OUTPATIENT
Start: 2020-02-14 | End: 2020-02-14

## 2020-02-14 NOTE — ASU DISCHARGE PLAN (ADULT/PEDIATRIC) - CALL YOUR DOCTOR IF YOU HAVE ANY OF THE FOLLOWING:
Swelling that gets worse/Pain not relieved by Medications/Fever greater than (need to indicate Fahrenheit or Celsius)/Bleeding that does not stop/Inability to tolerate liquids or foods

## 2020-02-14 NOTE — ASU DISCHARGE PLAN (ADULT/PEDIATRIC) - CARE PROVIDER_API CALL
Sebastien Fonseca (DDS)  Dentistry  [address]  [place] [address]  Phone: [phone]  Fax: [phone]  Follow Up Time:

## 2020-02-14 NOTE — ASU DISCHARGE PLAN (ADULT/PEDIATRIC) - ASU DC SPECIAL INSTRUCTIONSFT
Elevate head 20degrees. No forceful spitting, or drinking through straws. Bite  on gauze for 20 min and change regularly for next 24 hours or until bleeding  has stopped. Ice to face 20 min on and 20 min off. Pain medications as  needed.

## 2020-02-14 NOTE — ASU PREOP CHECKLIST - COMMENTS
benztropine folic acid trileptal Risperdal sodium chloride vitamin b12 vitamin d Oxybutynin and pecid with sip of water this am

## 2020-02-14 NOTE — ASU DISCHARGE PLAN (ADULT/PEDIATRIC) - NURSING INSTRUCTIONS
DO NOT take any Tylenol (Acetaminophen) or narcotics containing Tylenol until after  3.30pm and no Motrin, Toradol, Advil ,NSAID until after 3.15pm  . You received Tylenol during your operation and it can cause damage to your liver if too much is taken within a 24 hour time period.

## 2021-06-11 NOTE — H&P PST ADULT - CARDIOVASCULAR
ACN received a call from  Pharmacist Carito and she reported that the patient is discharging today. She updated that the patient is currently on:    1. Amiodarone started on 9/3  2. Rifampin started on 9/8  3. Vanco IV every other day.    She reported that the patient's INR today of 3.11 and that the patient will be instructed to hold warfarin at discharge.    She is recommending for INR check on 9/18.    Made aware that information will be relayed to patient's primary ACN.    Carolyn Mclean RN     details… detailed exam

## 2022-02-03 PROBLEM — Z86.59 PERSONAL HISTORY OF OTHER MENTAL AND BEHAVIORAL DISORDERS: Chronic | Status: ACTIVE | Noted: 2020-02-10

## 2022-02-03 PROBLEM — F31.9 BIPOLAR DISORDER, UNSPECIFIED: Chronic | Status: ACTIVE | Noted: 2020-02-10

## 2022-02-03 PROBLEM — A69.20 LYME DISEASE, UNSPECIFIED: Chronic | Status: ACTIVE | Noted: 2020-02-10

## 2022-04-07 ENCOUNTER — APPOINTMENT (OUTPATIENT)
Dept: OPHTHALMOLOGY | Facility: CLINIC | Age: 42
End: 2022-04-07
Payer: MEDICAID

## 2022-04-07 ENCOUNTER — NON-APPOINTMENT (OUTPATIENT)
Age: 42
End: 2022-04-07

## 2022-04-07 PROCEDURE — 99203 OFFICE O/P NEW LOW 30 MIN: CPT

## 2023-05-26 NOTE — ASU PATIENT PROFILE, ADULT - NS PRO INFO GIVEN TO
THE [facility] (per patient request)  Dr. Perico Leong or Melissa Bond Dr. [facility] [phone] family

## 2023-12-01 ENCOUNTER — OUTPATIENT (OUTPATIENT)
Dept: OUTPATIENT SERVICES | Facility: HOSPITAL | Age: 43
LOS: 1 days | End: 2023-12-01

## 2023-12-01 VITALS
WEIGHT: 182.1 LBS | DIASTOLIC BLOOD PRESSURE: 75 MMHG | RESPIRATION RATE: 16 BRPM | HEART RATE: 80 BPM | OXYGEN SATURATION: 97 % | HEIGHT: 69 IN | TEMPERATURE: 97 F | SYSTOLIC BLOOD PRESSURE: 105 MMHG

## 2023-12-01 DIAGNOSIS — F84.0 AUTISTIC DISORDER: ICD-10-CM

## 2023-12-01 DIAGNOSIS — Z92.89 PERSONAL HISTORY OF OTHER MEDICAL TREATMENT: Chronic | ICD-10-CM

## 2023-12-01 DIAGNOSIS — Z86.59 PERSONAL HISTORY OF OTHER MENTAL AND BEHAVIORAL DISORDERS: ICD-10-CM

## 2023-12-01 DIAGNOSIS — D69.6 THROMBOCYTOPENIA, UNSPECIFIED: ICD-10-CM

## 2023-12-01 DIAGNOSIS — K02.9 DENTAL CARIES, UNSPECIFIED: ICD-10-CM

## 2023-12-01 LAB
ANION GAP SERPL CALC-SCNC: 10 MMOL/L — SIGNIFICANT CHANGE UP (ref 7–14)
ANION GAP SERPL CALC-SCNC: 10 MMOL/L — SIGNIFICANT CHANGE UP (ref 7–14)
BUN SERPL-MCNC: 8 MG/DL — SIGNIFICANT CHANGE UP (ref 7–23)
BUN SERPL-MCNC: 8 MG/DL — SIGNIFICANT CHANGE UP (ref 7–23)
CALCIUM SERPL-MCNC: 9.4 MG/DL — SIGNIFICANT CHANGE UP (ref 8.4–10.5)
CALCIUM SERPL-MCNC: 9.4 MG/DL — SIGNIFICANT CHANGE UP (ref 8.4–10.5)
CHLORIDE SERPL-SCNC: 95 MMOL/L — LOW (ref 98–107)
CHLORIDE SERPL-SCNC: 95 MMOL/L — LOW (ref 98–107)
CO2 SERPL-SCNC: 30 MMOL/L — SIGNIFICANT CHANGE UP (ref 22–31)
CO2 SERPL-SCNC: 30 MMOL/L — SIGNIFICANT CHANGE UP (ref 22–31)
CREAT SERPL-MCNC: 0.78 MG/DL — SIGNIFICANT CHANGE UP (ref 0.5–1.3)
CREAT SERPL-MCNC: 0.78 MG/DL — SIGNIFICANT CHANGE UP (ref 0.5–1.3)
EGFR: 114 ML/MIN/1.73M2 — SIGNIFICANT CHANGE UP
EGFR: 114 ML/MIN/1.73M2 — SIGNIFICANT CHANGE UP
GLUCOSE SERPL-MCNC: 96 MG/DL — SIGNIFICANT CHANGE UP (ref 70–99)
GLUCOSE SERPL-MCNC: 96 MG/DL — SIGNIFICANT CHANGE UP (ref 70–99)
POTASSIUM SERPL-MCNC: 3.7 MMOL/L — SIGNIFICANT CHANGE UP (ref 3.5–5.3)
POTASSIUM SERPL-MCNC: 3.7 MMOL/L — SIGNIFICANT CHANGE UP (ref 3.5–5.3)
POTASSIUM SERPL-SCNC: 3.7 MMOL/L — SIGNIFICANT CHANGE UP (ref 3.5–5.3)
POTASSIUM SERPL-SCNC: 3.7 MMOL/L — SIGNIFICANT CHANGE UP (ref 3.5–5.3)
SODIUM SERPL-SCNC: 135 MMOL/L — SIGNIFICANT CHANGE UP (ref 135–145)
SODIUM SERPL-SCNC: 135 MMOL/L — SIGNIFICANT CHANGE UP (ref 135–145)

## 2023-12-01 RX ORDER — SODIUM CHLORIDE 9 MG/ML
1000 INJECTION, SOLUTION INTRAVENOUS
Refills: 0 | Status: DISCONTINUED | OUTPATIENT
Start: 2023-12-08 | End: 2023-12-22

## 2023-12-01 NOTE — H&P PST ADULT - NSICDXFAMILYHX_GEN_ALL_CORE_FT
FAMILY HISTORY:  Father  Still living? Yes, Estimated age: Age Unknown  Family history of diabetes mellitus (DM), Age at diagnosis: Age Unknown  Family history of Hashimoto thyroiditis, Age at diagnosis: Age Unknown  FH: HTN (hypertension), Age at diagnosis: Age Unknown

## 2023-12-01 NOTE — H&P PST ADULT - SOURCE OF INFORMATION, PROFILE
Mansi & Bridger (parents) - legal guardians/family/mother/father Mansi & Bridger Youssef (parents) - legal guardians/family/mother/father

## 2023-12-01 NOTE — H&P PST ADULT - OTHER CARE PROVIDERS
Dr Elliott - hematologist  [phone] Dr Elliott - hematologist  [phone] Dr Elliott - hematologist  [phone]

## 2023-12-01 NOTE — H&P PST ADULT - HISTORY OF PRESENT ILLNESS
42 y/o male with hx Autisism 44 y/o male with hx Autisism 44 y/o male with hx Autism, Bipolar Disorder, Impulsive Control Disorder, Thrombocytopenia, presents for preop evaluation for exam under anesthesia, restorations, extractions tentatively for 12/08/23. 42 y/o male with hx Autism, Bipolar Disorder, Impulsive Control Disorder, Thrombocytopenia, presents for preop evaluation for exam under anesthesia, restorations, extractions tentatively for 12/08/23.

## 2023-12-01 NOTE — H&P PST ADULT - PRIMARY CARE PROVIDER
Dr Thornton   [phone] Dr Thornton   [phone] Dr Thornton   [phone] Dr Thornton - pcp  [phone] Dr Thornton - pcp  [phone] Dr Thornton - pcp  [phone]

## 2023-12-01 NOTE — H&P PST ADULT - PROBLEM SELECTOR PLAN 1
scheduled for exam under anesthesia, restorations, extractions tentatively for 12/08/23.  Pre-op instructions provided. Pt given verbal and written instructions with teach back . Pt verbalized understanding with return demonstration.   Pending labs. medical clearance on chart; pending last hematology note  Legal guardians - parents will be present for dental procedure

## 2023-12-01 NOTE — H&P PST ADULT - PRO ARRIVE FROM
Adult Home - [facility] [place]/other (specify) Adult Home - [facility] [place]/other (specify) Adult Home - [facility] - [place]/other (specify)

## 2023-12-01 NOTE — H&P PST ADULT - PROBLEM SELECTOR PROBLEM 2
SEEN AND EXAMINED BY DR MARROQUIN. STABLE VITALS. MEDICALLY CLEARED FOR BOOKING. 
DISCHARGE TO PD IN STABLE CONDITION.
H/O autism

## 2023-12-07 ENCOUNTER — TRANSCRIPTION ENCOUNTER (OUTPATIENT)
Age: 43
End: 2023-12-07

## 2023-12-07 NOTE — ASU PATIENT PROFILE, ADULT - PRO ARRIVE FROM
Adult Home - [facility] - [place]/other (specify) Adult Home - [facility] - [place]/other (specify)

## 2023-12-07 NOTE — ASU PATIENT PROFILE, ADULT - FALL HARM RISK - UNIVERSAL INTERVENTIONS
Bed in lowest position, wheels locked, appropriate side rails in place/Call bell, personal items and telephone in reach/Instruct patient to call for assistance before getting out of bed or chair/Non-slip footwear when patient is out of bed/[place] to call system/Physically safe environment - no spills, clutter or unnecessary equipment/Purposeful Proactive Rounding/Room/bathroom lighting operational, light cord in reach Bed in lowest position, wheels locked, appropriate side rails in place/Call bell, personal items and telephone in reach/Instruct patient to call for assistance before getting out of bed or chair/Non-slip footwear when patient is out of bed/[employer] to call system/Physically safe environment - no spills, clutter or unnecessary equipment/Purposeful Proactive Rounding/Room/bathroom lighting operational, light cord in reach

## 2023-12-08 ENCOUNTER — APPOINTMENT (OUTPATIENT)
Age: 43
End: 2023-12-08

## 2023-12-08 ENCOUNTER — TRANSCRIPTION ENCOUNTER (OUTPATIENT)
Age: 43
End: 2023-12-08

## 2023-12-08 ENCOUNTER — OUTPATIENT (OUTPATIENT)
Dept: OUTPATIENT SERVICES | Facility: HOSPITAL | Age: 43
LOS: 1 days | Discharge: HOME | End: 2023-12-08

## 2023-12-08 ENCOUNTER — APPOINTMENT (OUTPATIENT)
Age: 43
End: 2023-12-08
Payer: MEDICAID

## 2023-12-08 VITALS
HEIGHT: 69 IN | RESPIRATION RATE: 18 BRPM | TEMPERATURE: 98 F | HEART RATE: 90 BPM | SYSTOLIC BLOOD PRESSURE: 94 MMHG | WEIGHT: 182.1 LBS | DIASTOLIC BLOOD PRESSURE: 78 MMHG | OXYGEN SATURATION: 97 %

## 2023-12-08 VITALS
TEMPERATURE: 97 F | SYSTOLIC BLOOD PRESSURE: 112 MMHG | OXYGEN SATURATION: 98 % | HEART RATE: 78 BPM | RESPIRATION RATE: 17 BRPM | DIASTOLIC BLOOD PRESSURE: 71 MMHG

## 2023-12-08 DIAGNOSIS — Z92.89 PERSONAL HISTORY OF OTHER MEDICAL TREATMENT: Chronic | ICD-10-CM

## 2023-12-08 DIAGNOSIS — F84.0 AUTISTIC DISORDER: ICD-10-CM

## 2023-12-08 PROCEDURE — XXXXX: CPT | Mod: 1L

## 2023-12-08 RX ORDER — OXCARBAZEPINE 300 MG/1
1 TABLET, FILM COATED ORAL
Qty: 0 | Refills: 0 | DISCHARGE

## 2023-12-08 RX ORDER — FENTANYL CITRATE 50 UG/ML
50 INJECTION INTRAVENOUS ONCE
Refills: 0 | Status: DISCONTINUED | OUTPATIENT
Start: 2023-12-08 | End: 2023-12-08

## 2023-12-08 RX ORDER — FENTANYL CITRATE 50 UG/ML
25 INJECTION INTRAVENOUS
Refills: 0 | Status: DISCONTINUED | OUTPATIENT
Start: 2023-12-08 | End: 2023-12-08

## 2023-12-08 RX ORDER — RISPERIDONE 4 MG/1
1 TABLET ORAL
Qty: 0 | Refills: 0 | DISCHARGE

## 2023-12-08 RX ORDER — FOLIC ACID 0.8 MG
1 TABLET ORAL
Qty: 0 | Refills: 0 | DISCHARGE

## 2023-12-08 RX ORDER — PREGABALIN 225 MG/1
1 CAPSULE ORAL
Qty: 0 | Refills: 0 | DISCHARGE

## 2023-12-08 RX ORDER — POLYETHYLENE GLYCOL 3350 17 G/17G
0 POWDER, FOR SOLUTION ORAL
Qty: 0 | Refills: 0 | DISCHARGE

## 2023-12-08 RX ORDER — CHOLECALCIFEROL (VITAMIN D3) 125 MCG
1 CAPSULE ORAL
Qty: 0 | Refills: 0 | DISCHARGE

## 2023-12-08 RX ORDER — RISPERIDONE 4 MG/1
2 TABLET ORAL
Qty: 0 | Refills: 0 | DISCHARGE

## 2023-12-08 RX ORDER — DIVALPROEX SODIUM 500 MG/1
4 TABLET, DELAYED RELEASE ORAL
Qty: 0 | Refills: 0 | DISCHARGE

## 2023-12-08 RX ORDER — BENZTROPINE MESYLATE 1 MG
1 TABLET ORAL
Qty: 0 | Refills: 0 | DISCHARGE

## 2023-12-08 RX ORDER — SODIUM CHLORIDE 9 MG/ML
1 INJECTION INTRAMUSCULAR; INTRAVENOUS; SUBCUTANEOUS
Qty: 0 | Refills: 0 | DISCHARGE

## 2023-12-08 RX ORDER — OXYBUTYNIN CHLORIDE 5 MG
1 TABLET ORAL
Qty: 0 | Refills: 0 | DISCHARGE

## 2023-12-08 RX ORDER — ASCORBIC ACID 60 MG
1 TABLET,CHEWABLE ORAL
Qty: 0 | Refills: 0 | DISCHARGE

## 2023-12-08 NOTE — ASU DISCHARGE PLAN (ADULT/PEDIATRIC) - NURSING INSTRUCTIONS
DO NOT take any Tylenol (Acetaminophen) or narcotics containing Tylenol until after  __3:35pm____ . You received Tylenol during your operation and it can cause damage to your liver if too much is taken within a 24 hour time period.

## 2023-12-08 NOTE — ASU DISCHARGE PLAN (ADULT/PEDIATRIC) - COMMENTS
No appointment has been made. Please call the [facility] on Monday to make an appointment for follow up for 1 week. [phone]. No appointment has been made. Please call the [facility] on Monday to make an appointment for follow up for 1 week. [phone].

## 2023-12-08 NOTE — ASU DISCHARGE PLAN (ADULT/PEDIATRIC) - NS MD DC FALL RISK RISK
For information on Fall & Injury Prevention, visit: [email or web address] OR  [email or web address]-injury OR  https://www.cdc.gov/steadi/patient.html For information on Fall & Injury Prevention, visit: [email or web address]-mobility OR  [email or web address]-[email or web address]-avoid-injury OR  https://www.cdc.gov/steadi/patient.html

## 2023-12-08 NOTE — ASU DISCHARGE PLAN (ADULT/PEDIATRIC) - FOLLOW UP APPOINTMENTS
[facility] [phone]/[facility] [phone]/[facility]

## 2023-12-08 NOTE — ASU PREOP CHECKLIST - COMMENTS
pt took resiperadol, Na+ tab, trileptal with water pt took resiperadol, Na+ tab, trileptal benziprine  with water

## 2023-12-08 NOTE — ASU DISCHARGE PLAN (ADULT/PEDIATRIC) - ASU DC SPECIAL INSTRUCTIONSFT
Elevate head 20 degrees. No forceful spitting or drinking through straws. Bite on gauze for 20 min and change regularly for next 24 hours or until bleeding has stopped. Ice to face 20 min on and 20 min off. Pain medications as needed. Soft diet for 1 week.

## 2023-12-15 ENCOUNTER — APPOINTMENT (OUTPATIENT)
Age: 43
End: 2023-12-15
Payer: MEDICAID

## 2023-12-15 PROCEDURE — 99024 POSTOP FOLLOW-UP VISIT: CPT

## 2023-12-22 ENCOUNTER — APPOINTMENT (OUTPATIENT)
Age: 43
End: 2023-12-22

## 2025-06-09 NOTE — H&P PST ADULT - PSYCHIATRIC
SEBORRHEIC KERATOSES- chest and back  - Relevant exam: Scattered over the trunk/extremities are waxy brown to black plaques and papules with stuck on appearance  - Exam and clinical history consistent with seborrheic keratoses  - Counseled that these are benign growths that do not require treatment  - Counseled that removal of lesions is considered cosmetic and so would incur a fee should patient elect to move forward.   - Patient to hold on treatments for now but will inform us should they desire additional treatments    MELANOCYTIC NEVI  -Relevant exam: Scattered over the trunk/extremities are homogenously pigmented brown macules and papules. ELM performed and without concerning findings.  - Exam and clinical history consistent with melanocytic nevi  - Educated on the ABCDE's of melanoma; handout provided  - Counseled to return to clinic prior to scheduled appointment should any of these lesions change or should any new lesions of concern arise  - Counseled on use of sun protection daily. Reviewed latest FDA sunscreen guidelines, including use of broad spectrum (UVA and UVB blocking) sunscreen or sun protective clothing with SPF 30-50 every 2-3 hours and reapplied after exposure to water; use of photoprotective clothing, including a broad brim hat and UPF rated clothing if outdoors for several hours; avoid use of tanning beds as these pose significant risk for melanoma and skin cancer.    LENTIGINES- chest an back  OTHER SKIN CHANGES DUE TO CHRONIC EXPOSURE TO NONIONIZING RADIATION  - Relevant exam: Over sun exposed areas are brown macules. ELM performed and without concerning findings.  - Exam and clinical history consistent with lentigines.  - Educated that these are indicative of prior sun exposure.   - Counseled to return to clinic prior to scheduled appointment should any of these lesions change or should any new lesions of concern arise.  - Recommended use of sunscreen as above and below.  - Counseled on use  of sun protection daily. Reviewed latest FDA sunscreen guidelines, including use of broad spectrum (UVA and UVB blocking) sunscreen or sun protective clothing with SPF 30-50 every 2-3 hours and reapplied after exposure to water; use of photoprotective clothing, including a broad brim hat and UPF rated clothing if outdoors for several hours; avoid use of tanning beds as these pose significant risk for melanoma and skin cancer.    CHERRY ANGIOMAS  - Relevant exam: Scattered over the trunk/extremities are red papules  - Exam and clinical history consistent with cherry angiomas  - Educated that these are benign  - Educated that removal is considered aesthetic and would incur a fee.  - Patient does not wish to pursue removal at this time but will contact us should this change.    ACTINIC KERATOSES  - Relevant exam: On the left later cheek  are gritty pink papules  - Exam and clinical history consistent with actinic keratoses  - Discussed that these lesions are considered premalignant with the potential to evolve into squamous cell carcinoma.   - Discussed that these are due to chronic sun exposure and therefore recommend use of sunscreen/sun protection to prevent further sun damage  - Discussed treatment options, including liquid nitrogen destruction, topical immunotherapy, and photodynamic therapy, including risks, benefits    OPTION 1:     - The patient agreed to treatment with combination efudex/calcipotriene for **4 days BID to the face; 6 days BID to the extremities/trunk; 7 days BID to the scalp** - Common side effects for this treatment were discussed and handout provided  - Prescription ordered through [facility]. Informed patient that [facility] would contact patient to obtain billing information and shipping address. Patient provided the below preferred contact information for pharmacy:     Email:   Phone:      OPTION 2:     - The patient agreed to treatment with topical efudex 5% for **7 days BID to  the face; 14 days BID to the extremities/trunk; 21 days BID to the scalp**   - Common side effects for this treatment were discussed and handout provided    OPTION 3:    PROCEDURE:  DESTRUCTION OF PRE-MALIGNANT LESIONS    - After a thorough discussion of treatment options and risk/benefits/alternatives (including but not limited to local pain, scarring, dyspigmentation, blistering, and possible superinfection), verbal and written consent were obtained and the aforementioned lesions were treated on with cryotherapy using liquid nitrogen x 1 cycle for 5-10 seconds.    TOTAL NUMBER of 1 pre-malignant lesions were treated today on the ANATOMIC LOCATION: left lateral cheek.     The patient tolerated the procedure well, and after-care instructions were provided.     details… detailed exam

## 2025-09-16 ENCOUNTER — APPOINTMENT (OUTPATIENT)
Age: 45
End: 2025-09-16
Payer: MEDICAID

## 2025-09-16 PROCEDURE — ZZZZZ: CPT

## (undated) DEVICE — DRAPE INSTRUMENT POUCH 6.75" X 11"

## (undated) DEVICE — VENODYNE/SCD SLEEVE CALF MEDIUM

## (undated) DEVICE — DRAPE SURGICAL #1010

## (undated) DEVICE — PACKING GAUZE PLAIN 1"

## (undated) DEVICE — TUBING SUCTION NONCONDUCTIVE 6MM X 12FT

## (undated) DEVICE — ELCTR GROUNDING PAD ADULT COVIDIEN

## (undated) DEVICE — DRAPE 3/4 SHEET 52X76"

## (undated) DEVICE — VAGINAL PACKING 2"

## (undated) DEVICE — SOL IRR POUR NS 0.9% 500ML

## (undated) DEVICE — DRAPE LIGHT HANDLE COVER (GREEN)

## (undated) DEVICE — POOLE SUCTION TIP

## (undated) DEVICE — DRAPE MAGNETIC INSTRUMENT MEDIUM

## (undated) DEVICE — DRSG CURITY GAUZE SPONGE 4 X 4" 12-PLY

## (undated) DEVICE — DRAPE CAMERA ENDOMATE

## (undated) DEVICE — BASIN SET DOUBLE

## (undated) DEVICE — SUCTION YANKAUER OPEN TIP NO VENT CURVE

## (undated) DEVICE — GOWN XL

## (undated) DEVICE — DRAPE SPLIT SHEET 77" X 120"

## (undated) DEVICE — LABELS BLANK W PEN